# Patient Record
Sex: MALE | Race: BLACK OR AFRICAN AMERICAN | NOT HISPANIC OR LATINO | Employment: FULL TIME | URBAN - METROPOLITAN AREA
[De-identification: names, ages, dates, MRNs, and addresses within clinical notes are randomized per-mention and may not be internally consistent; named-entity substitution may affect disease eponyms.]

---

## 2022-05-26 ENCOUNTER — TRANSFERRED RECORDS (OUTPATIENT)
Dept: FAMILY MEDICINE | Facility: CLINIC | Age: 37
End: 2022-05-26

## 2023-05-24 ENCOUNTER — TRANSFERRED RECORDS (OUTPATIENT)
Dept: FAMILY MEDICINE | Facility: CLINIC | Age: 38
End: 2023-05-24

## 2023-05-24 ENCOUNTER — OFFICE VISIT (OUTPATIENT)
Dept: FAMILY MEDICINE | Facility: CLINIC | Age: 38
End: 2023-05-24

## 2023-05-24 DIAGNOSIS — G89.29 CHRONIC PAIN OF LEFT KNEE: ICD-10-CM

## 2023-05-24 DIAGNOSIS — Z00.00 ROUTINE PHYSICAL EXAMINATION: Primary | ICD-10-CM

## 2023-05-24 DIAGNOSIS — M25.562 CHRONIC PAIN OF LEFT KNEE: ICD-10-CM

## 2023-05-24 DIAGNOSIS — E78.2 MIXED HYPERLIPIDEMIA: ICD-10-CM

## 2023-05-24 PROCEDURE — 99395 PREV VISIT EST AGE 18-39: CPT | Performed by: STUDENT IN AN ORGANIZED HEALTH CARE EDUCATION/TRAINING PROGRAM

## 2023-05-30 VITALS
SYSTOLIC BLOOD PRESSURE: 124 MMHG | DIASTOLIC BLOOD PRESSURE: 76 MMHG | RESPIRATION RATE: 12 BRPM | HEART RATE: 58 BPM | BODY MASS INDEX: 29.9 KG/M2 | HEIGHT: 74 IN | WEIGHT: 233 LBS

## 2023-05-30 NOTE — PROGRESS NOTES
"3  SUBJECTIVE:   CC: Marquis ALON Taveras is an 37 year old male who presents for preventive health visit.     Patient has been advised of split billing requirements and indicates understanding: Yes    Healthy Habits:    General health: very good    Diet: healthy    Exercise: daily    Sleep: no concerns     Mental Health: no concerns          Problems taking medications regularly not applicable    Have you had an eye exam in the past two years? Wears contacts, needs to establish with new eye doctor     Do you see a dentist twice per year? yes    Do you have sleep apnea, excessive snoring or daytime drowsiness?no    Do you feel safe in your environment? Yes    Have you ever done Advance Care Planning? (For example, a Health Directive, POLST, or a discussion with a medical provider or your loved ones about your wishes): No, advance care planning information given to patient to review.  Patient declined advance care planning discussion at this time.    Social History     Tobacco Use     Smoking status: Never     Smokeless tobacco: Never   Vaping Use     Vaping status: Not on file   Substance Use Topics     Alcohol use: Not Currently     If you drink alcohol do you typically have >3 drinks per day or >7 drinks per week? No                      Last PSA: No results found for: PSA    Reviewed orders with patient. Reviewed health maintenance and updated orders accordingly - Yes    Reviewed and updated as needed this visit by clinical staff                  Reviewed and updated as needed this visit by Provider                 Past Medical History:   Diagnosis Date     HLD (hyperlipidemia)      Tear meniscus knee     left      No past surgical history on file.  Family History   Family history unknown: Yes       ROS:  12 point ROS performed and negative for new concerns except as mentioned above     OBJECTIVE:   /76   Pulse 58   Resp 12   Ht 1.88 m (6' 2\")   Wt 105.7 kg (233 lb)   BMI 29.92 kg/m    EXAM:  GENERAL: " "healthy, alert and no distress  EYES: Eyes grossly normal to inspection, PERRL and conjunctivae and sclerae normal  HENT: ear canals and TM's normal, nose and mouth without ulcers or lesions  NECK: no adenopathy, no asymmetry, masses, or scars and thyroid normal to palpation  RESP: lungs clear to auscultation - no rales, rhonchi or wheezes  CV: regular rate and rhythm, normal S1 S2, no S3 or S4, no murmur, click or rub, no peripheral edema and peripheral pulses strong  ABDOMEN: soft, nontender, no hepatosplenomegaly, no masses and bowel sounds normal  MS: no gross musculoskeletal defects noted, no edema  SKIN: no suspicious lesions or rashes  NEURO: Normal strength and tone, mentation intact and speech normal  PSYCH: mentation appears normal, affect normal/bright    Diagnostic Test Results:  Outside labs reviewed with patient, see scanned report   EKG: sinus bradycardia, normal intervals, no ST/T changes    ASSESSMENT/PLAN:       ICD-10-CM    1. Routine physical examination  Z00.00       2. Mixed hyperlipidemia  E78.2       3. Chronic pain of left knee  M25.562     G89.29          Pt known to me from [facility]  Lipids improved  Follow-up with eye doctor encouraged  Follow-up on knee at [facility]    Patient has been advised of split billing requirements and indicates understanding: Yes  COUNSELING:  Reviewed preventive health counseling, as reflected in patient instructions       Regular exercise       Healthy diet/nutrition       Vision screening       Hearing screening       Alcohol Use        Colorectal cancer screening       Prostate cancer screening    Estimated body mass index is 29.92 kg/m  as calculated from the following:    Height as of this encounter: 1.88 m (6' 2\").    Weight as of this encounter: 105.7 kg (233 lb).      He reports that he has never smoked. He has never used smokeless tobacco.        Jose Jamison MD, [facility]    "

## 2024-03-18 ENCOUNTER — OFFICE VISIT (OUTPATIENT)
Dept: FAMILY MEDICINE | Facility: CLINIC | Age: 39
End: 2024-03-18

## 2024-03-18 VITALS
WEIGHT: 235 LBS | HEART RATE: 69 BPM | RESPIRATION RATE: 22 BRPM | TEMPERATURE: 98 F | BODY MASS INDEX: 30.17 KG/M2 | SYSTOLIC BLOOD PRESSURE: 130 MMHG | DIASTOLIC BLOOD PRESSURE: 76 MMHG | OXYGEN SATURATION: 98 %

## 2024-03-18 DIAGNOSIS — R06.2 WHEEZING: ICD-10-CM

## 2024-03-18 DIAGNOSIS — R05.8 PRODUCTIVE COUGH: Primary | ICD-10-CM

## 2024-03-18 PROCEDURE — 99214 OFFICE O/P EST MOD 30 MIN: CPT | Performed by: STUDENT IN AN ORGANIZED HEALTH CARE EDUCATION/TRAINING PROGRAM

## 2024-03-18 PROCEDURE — 71046 X-RAY EXAM CHEST 2 VIEWS: CPT | Performed by: STUDENT IN AN ORGANIZED HEALTH CARE EDUCATION/TRAINING PROGRAM

## 2024-03-18 RX ORDER — AZITHROMYCIN 250 MG/1
TABLET, FILM COATED ORAL
Qty: 6 TABLET | Refills: 0 | Status: SHIPPED | OUTPATIENT
Start: 2024-03-18 | End: 2024-03-23

## 2024-03-18 RX ORDER — PREDNISONE 20 MG/1
40 TABLET ORAL
Qty: 10 TABLET | Refills: 0 | Status: SHIPPED | OUTPATIENT
Start: 2024-03-18 | End: 2024-03-23

## 2024-03-18 NOTE — PROGRESS NOTES
Assessment & Plan       ICD-10-CM    1. Productive cough  R05.8 azithromycin (ZITHROMAX) 250 MG tablet     XR Chest 2 Views     CANCELED: XR Chest 2 Views      2. Wheezing  R06.2 predniSONE (DELTASONE) 20 MG tablet     XR Chest 2 Views         Exam and CXR reassuring but given wheezing and duration of sx r/b/a azithro/prednisone reviewed and patient elects to start.     Reasons to follow-up or seek emergent care reviewed.     >30 minutes spent on the date of the encounter doing chart review, history and exam, documentation and further activities per the note    Jose Jamison MD, [facility]      Subjective     Marquis ALON Taveras is a 38 year old male who presents to clinic today for the following health issues:    HPI   Chief Complaint   Patient presents with    Cough     Has had a lingering cough for the past month now, has been on and off, now is better but still feels like he has some congestion in his chest on the right side, wondering what could be causing this, has been taking dayquil to help, does hear some wheezing when breathing       Intermittent cough and wheezing x 1 month, sometimes productive (initially brown/yellow now more white)  Initially cough was worse and had sore throat and sinus congestion   Hx of asthma in college, no current tx  Multiple recent trips for work and vacation in [facility] [place], [place]...  Has been working out without any cardiac concerns        Objective    /76 (BP Location: Left arm, Patient Position: Sitting, Cuff Size: Adult Large)   Pulse 69   Temp 98  F (36.7  C) (Temporal)   Resp 22   Wt 106.6 kg (235 lb)   SpO2 98%   BMI 30.17 kg/m    Body mass index is 30.17 kg/m .  Alert, NAD  NC/AT  Sclerae anicteric  Neck supple  RRR no mumur  Resp nonlabored, occas dry cough, faint RUL wheeze otherwise ctab  Skin warm and dry  No focal neuro deficits. Speech intact. Normal gait.  Appropriate affect     Labs reviewed.  IMAGING  Results for orders  placed or performed in visit on 03/18/24   XR Chest 2 Views     Status: None    Narrative    Radiologist Consultation/:   Fax:  [phone]  [phone]  _____________________________________________________________________________________________________________________________________________________________________________________________________________________________________________________________________________________________________________________________________________________________________________________________________________________________________________________________________________________________________  PATIENT NAME: Marquis Nitin  YOB: 1985 Age: 38 ACCESSION NUMBER: [id number]  SEX: ORDERING PROVIDER: Jose Jamison  FACILITY: [facility] PRIMARY PROVIDER:  PATIENT ID: [id number] INTERESTED PARTY:  Page 1 of 1  _________________________________________________________________________________________________________________________________________________________________________________________________________________________________________________________________________________________________________________________________________________________________________________________  EXAM: X-RAY CHEST 2 VIEW PA LATERAL  LOCATION: [facility]  DATE: 3/18/2024  INDICATION: Cough.  COMPARISON: None.  IMPRESSION:  No focal consolidation, pleural effusion or pneumothorax.  SIGNED BY: Brianda Persaud MD 3/20/2024 9:24 AM

## 2024-03-18 NOTE — NURSING NOTE
Chief Complaint   Patient presents with    Cough     Has had a lingering cough for the past month now, has been on and off, now is better but still feels like he has some congestion in his chest on the right side, wondering what could be causing this, has been taking dayquil to help, does hear some wheezing when breathing      Pre-visit Screening:  Immunizations:  up to date  Colonoscopy:  na  Mammogram: na  Asthma Action Test/Plan:  na  PHQ9:  na  GAD7:  na  Questioned patient about current smoking habits Pt. has never smoked.  Ok to leave detailed message on voice mail for today's visit only yes, phone # [phone] (home) [phone] (work)

## 2024-05-30 ENCOUNTER — TRANSFERRED RECORDS (OUTPATIENT)
Dept: FAMILY MEDICINE | Facility: CLINIC | Age: 39
End: 2024-05-30

## 2024-05-30 ENCOUNTER — OFFICE VISIT (OUTPATIENT)
Dept: FAMILY MEDICINE | Facility: CLINIC | Age: 39
End: 2024-05-30

## 2024-05-30 DIAGNOSIS — Z00.00 ROUTINE PHYSICAL EXAMINATION: Primary | ICD-10-CM

## 2024-05-30 DIAGNOSIS — R29.818 SUSPECTED SLEEP APNEA: ICD-10-CM

## 2024-05-30 DIAGNOSIS — Z13.220 LIPID SCREENING: ICD-10-CM

## 2024-05-30 DIAGNOSIS — J30.2 SEASONAL ALLERGIES: ICD-10-CM

## 2024-05-30 DIAGNOSIS — Z13.1 DIABETES MELLITUS SCREENING: ICD-10-CM

## 2024-05-30 PROCEDURE — 99395 PREV VISIT EST AGE 18-39: CPT | Performed by: STUDENT IN AN ORGANIZED HEALTH CARE EDUCATION/TRAINING PROGRAM

## 2024-06-05 NOTE — PROGRESS NOTES
"  SUBJECTIVE:   CC: Marquis ALON Taveras is an 38 year old male who presents for preventive health visit.     Patient has been advised of split billing requirements and indicates understanding: Yes    Chief Complaint   Patient presents with    Physical        Healthy Habits:  General health: doing well except for seasonal allergies, active daily  Mental Health: no concerns      Problems taking medications regularly not applicable  Do you see a dentist twice per year? yes  Do you have sleep apnea, excessive snoring or daytime drowsiness? Does have some concern about snoring and energy level      Today's PHQ-2 Score:        No data to display                Do you feel safe in your environment? Yes        Social History     Tobacco Use    Smoking status: Never    Smokeless tobacco: Never   Substance Use Topics    Alcohol use: Not Currently     If you drink alcohol do you typically have >3 drinks per day or >7 drinks per week? No                      Last PSA: No results found for: \"PSA\"    Reviewed orders with patient. Reviewed health maintenance and updated orders accordingly - Yes  Lab work is in process  Labs reviewed in EPIC  BP Readings from Last 3 Encounters:   05/30/24 124/76   03/18/24 130/76   05/30/23 124/76    Wt Readings from Last 3 Encounters:   05/30/24 104.3 kg (230 lb)   03/18/24 106.6 kg (235 lb)   05/30/23 105.7 kg (233 lb)                    Reviewed and updated as needed this visit by clinical staff                  Reviewed and updated as needed this visit by Provider                  Past Medical History:   Diagnosis Date    HLD (hyperlipidemia)     Tear meniscus knee     left      No past surgical history on file.  Family History   Problem Relation Age of Onset    Cerebrovascular Disease Father        ROS:  12 point ROS performed and negative for new concerns except as mentioned above     OBJECTIVE:   /76   Pulse 50   Temp (!) 96.5  F (35.8  C)   Resp 12   Ht 1.88 m (6' 2\")   Wt 104.3 kg " "(230 lb)   BMI 29.53 kg/m    EXAM:  GENERAL: alert and no distress  EYES: Eyes grossly normal to inspection, PERRL and conjunctivae and sclerae normal  HENT: ear canals normal, serous effusions bilat, nose and mouth without ulcers or lesions  NECK: no adenopathy, no asymmetry, masses, or scars  RESP: lungs clear to auscultation - no rales, rhonchi or wheezes  CV: regular rate and rhythm, normal S1 S2, no S3 or S4, no murmur, click or rub, no peripheral edema  ABDOMEN: soft, nontender, no hepatosplenomegaly, no masses and bowel sounds normal  MS: no gross musculoskeletal defects noted, no edema  SKIN: no suspicious lesions or rashes  NEURO: Normal strength and tone, mentation intact and speech normal  PSYCH: mentation appears normal, affect normal/bright    Diagnostic Test Results:  Labs and EKG reviewed     ASSESSMENT/PLAN:       ICD-10-CM    1. Routine physical examination  Z00.00       2. Diabetes mellitus screening  Z13.1       3. Lipid screening  Z13.220       4. Seasonal allergies  J30.2       5. Suspected sleep apnea  R29.818          [employer] physical - see scanned packet for details  Sleep consult coordinated via team staff  Reviewed allergy tx options, ENT referral if not improving within next 3-4 weeks     Patient has been advised of split billing requirements and indicates understanding: Yes  COUNSELING:  Special attention given to:        Regular exercise       Healthy diet/nutrition       Vision screening       Immunizations       Alcohol Use        Safe sex practices/STD prevention       Colorectal cancer screening       Prostate cancer screening    Estimated body mass index is 29.53 kg/m  as calculated from the following:    Height as of this encounter: 1.88 m (6' 2\").    Weight as of this encounter: 104.3 kg (230 lb).      He reports that he has never smoked. He has never used smokeless tobacco.         Jose Jamison MD, [facility]   "

## 2024-09-05 VITALS
SYSTOLIC BLOOD PRESSURE: 124 MMHG | TEMPERATURE: 96.5 F | WEIGHT: 230 LBS | DIASTOLIC BLOOD PRESSURE: 76 MMHG | HEIGHT: 74 IN | BODY MASS INDEX: 29.52 KG/M2 | RESPIRATION RATE: 12 BRPM | HEART RATE: 50 BPM